# Patient Record
Sex: MALE | Race: WHITE | NOT HISPANIC OR LATINO | URBAN - METROPOLITAN AREA
[De-identification: names, ages, dates, MRNs, and addresses within clinical notes are randomized per-mention and may not be internally consistent; named-entity substitution may affect disease eponyms.]

---

## 2024-12-24 ENCOUNTER — APPOINTMENT (OUTPATIENT)
Dept: OTOLARYNGOLOGY | Facility: CLINIC | Age: 49
End: 2024-12-24
Payer: COMMERCIAL

## 2025-07-21 ENCOUNTER — APPOINTMENT (OUTPATIENT)
Dept: ORTHOPEDIC SURGERY | Facility: CLINIC | Age: 50
End: 2025-07-21
Payer: COMMERCIAL

## 2025-07-21 VITALS — HEIGHT: 74 IN | WEIGHT: 220 LBS | BODY MASS INDEX: 28.23 KG/M2

## 2025-07-21 DIAGNOSIS — S46.211A BICEPS STRAIN, RIGHT, INITIAL ENCOUNTER: Primary | ICD-10-CM

## 2025-07-21 PROCEDURE — 99203 OFFICE O/P NEW LOW 30 MIN: CPT | Performed by: ORTHOPAEDIC SURGERY

## 2025-07-21 PROCEDURE — 3008F BODY MASS INDEX DOCD: CPT | Performed by: ORTHOPAEDIC SURGERY

## 2025-07-21 NOTE — LETTER
July 21, 2025     ALBAN Lopez  [address]  [facility]  [facility] [address]    Patient: Wayne Mendoza   YOB: 1975   Date of Visit: 7/21/2025       Dear ALBAN Reagan:    Thank you for referring Wayne Mendoza to me for evaluation. Below are my notes for this consultation.  If you have questions, please do not hesitate to call me. I look forward to following your patient along with you.       Sincerely,     Valeriano Osman MD      CC: No Recipients  ______________________________________________________________________________________    [facility]  Hand and Upper Extremity Service  Initial evaluation / Consultation           Chief Complaint: Right arm          50 y.o right hand dominant male presenting for pain over the cornelius aspect of the upper arm just above the elbow. Symptoms have been present for 6-8 weeks and he does not recall any preceding traumatic event. He was seen by Dr. Olivares at [facility] and is here for a second opinion. About 30 years ago he sustained a right humeral shaft fracture and was treated with place fixation through a postero approach. 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 Over the last 6-8 weeks since his symptoms have been present, he has noticed burning and tingling symptoms in the superficial sensory nerve distribution on the dorsal radial aspect of the right hand. He has not had any formal treatment attempts yet but has been modifying his activities. It is with sustained forceful elbow flexion that he has most of his pain. Most of the pain is localized to the myotendinous junction of the biceps muscle well above the antecubital fossa.          Please refer to New Patient Intake Form scanned into patient's electronic record for self reported past medical history, past surgical  history, medications, allergies, family history, social history and 10 point review of systems    Examination:  Constitutional: Oriented to person, place, and time.  Appears well-developed and well-nourished.  Head: Normocephalic and atraumatic.  Eyes: Pupils are equal, round, and reactive to light.  Cardiovascular: Intact distal pulses.  Pulmonary/Chest/Breast: Effort normal. No respiratory distress.  Neurological: Alert and oriented to person, place, and time.  Skin: Skin is warm and dry.  Psychiatric: normal mood and affect.  Behavior is normal.  Musculoskeletal: Right arm reveals healed surgical incision postero aspect of the humerus. Good muscle bulk anteriorly and posteriorly. No change in the contour. Full elbow range of motion. No tenderness to palpation over the distal biceps tendon. Tendon is intact and palpable. Minimal pain to palpation over the myotendinous junction proximally of the biceps but pain is exacerbated in this same area with resisted elbow flexion or resisted forearm supination. Sensation intact to light touch but subjectively abnormal in the dorsal radial aspect of the right hand.        Personal Interpretation of Diagnostic studies: X-rays of right humerus brought in on disk from [facility] reveals a well healed humeral shaft fracture with anatomic alignment and a retained posterior surgical plate.        Impression:  Biceps strain       Plan: This appears to be localized to the myotendinous junction of his biceps muscle. His distal biceps clinically appears to be just fine and he is not having any pain with palpation or manipulation. Recommendations are for conservative management with activity modifications, anti-inflammatory medications, and physical therapy. If this does not resolve his symptoms then we can obtain MRI to evaluate the biceps. If that situation is encouraged, metal extraction is necessary because of his retained surgical implant. He has been referred to  physical therapy and if he is not noticing any improvement in his symptoms later in the summer, he can contact me so we can order a MRI of the right arm with metal subtraction technique.       Follow up: As needed              Valeriano Osman MD  [facility]  Department of Orthopaedic Surgery  Hand and Upper Extremity Reconstruction      Scribe Attestation  By signing my name below, I, Santos Joe , Scribe   attest that this documentation has been prepared under the direction and in the presence of Dr. Valeriano Osman.      Dictation performed with the use of voice recognition software.  Syntax and grammatical errors may exist.

## 2025-07-21 NOTE — PROGRESS NOTES
[facility]  Hand and Upper Extremity Service  Initial evaluation / Consultation           Chief Complaint: Right arm          50 y.o right hand dominant male presenting for pain over the cornelius aspect of the upper arm just above the elbow. Symptoms have been present for 6-8 weeks and he does not recall any preceding traumatic event. He was seen by Dr. Olivares at [facility] and is here for a second opinion. About 30 years ago he sustained a right humeral shaft fracture and was treated with place fixation through a postero approach. 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 Over the last 6-8 weeks since his symptoms have been present, he has noticed burning and tingling symptoms in the superficial sensory nerve distribution on the dorsal radial aspect of the right hand. He has not had any formal treatment attempts yet but has been modifying his activities. It is with sustained forceful elbow flexion that he has most of his pain. Most of the pain is localized to the myotendinous junction of the biceps muscle well above the antecubital fossa.          Please refer to New Patient Intake Form scanned into patient's electronic record for self reported past medical history, past surgical history, medications, allergies, family history, social history and 10 point review of systems    Examination:  Constitutional: Oriented to person, place, and time.  Appears well-developed and well-nourished.  Head: Normocephalic and atraumatic.  Eyes: Pupils are equal, round, and reactive to light.  Cardiovascular: Intact distal pulses.  Pulmonary/Chest/Breast: Effort normal. No respiratory distress.  Neurological: Alert and oriented to person, place, and time.  Skin: Skin is warm and dry.  Psychiatric: normal mood and affect.  Behavior is normal.  Musculoskeletal: Right arm reveals healed surgical incision postero aspect of the humerus. Good  muscle bulk anteriorly and posteriorly. No change in the contour. Full elbow range of motion. No tenderness to palpation over the distal biceps tendon. Tendon is intact and palpable. Minimal pain to palpation over the myotendinous junction proximally of the biceps but pain is exacerbated in this same area with resisted elbow flexion or resisted forearm supination. Sensation intact to light touch but subjectively abnormal in the dorsal radial aspect of the right hand.        Personal Interpretation of Diagnostic studies: X-rays of right humerus brought in on disk from [facility] reveals a well healed humeral shaft fracture with anatomic alignment and a retained posterior surgical plate.        Impression:  Biceps strain       Plan: This appears to be localized to the myotendinous junction of his biceps muscle. His distal biceps clinically appears to be just fine and he is not having any pain with palpation or manipulation. Recommendations are for conservative management with activity modifications, anti-inflammatory medications, and physical therapy. If this does not resolve his symptoms then we can obtain MRI to evaluate the biceps. If that situation is encouraged, metal extraction is necessary because of his retained surgical implant. He has been referred to physical therapy and if he is not noticing any improvement in his symptoms later in the summer, he can contact me so we can order a MRI of the right arm with metal subtraction technique.       Follow up: As needed              Valeriano Osman MD  [facility]  Department of Orthopaedic Surgery  Hand and Upper Extremity Reconstruction      Scribe Attestation  By signing my name below, I Lisetoneyda Diaz , Scribkristen   attest that this documentation has been prepared under the direction and in the presence of Dr. Valeriano Osman.      Dictation performed with the use of voice recognition software.  Syntax and grammatical errors  may exist.

## 2025-08-18 ENCOUNTER — DOCUMENTATION (OUTPATIENT)
Dept: PHYSICAL THERAPY | Facility: HOSPITAL | Age: 50
End: 2025-08-18
Payer: COMMERCIAL

## 2025-09-04 ENCOUNTER — EVALUATION (OUTPATIENT)
Dept: PHYSICAL THERAPY | Facility: HOSPITAL | Age: 50
End: 2025-09-04
Payer: COMMERCIAL

## 2025-09-04 DIAGNOSIS — S46.211D BICEPS STRAIN, RIGHT, SUBSEQUENT ENCOUNTER: Primary | ICD-10-CM

## 2025-09-04 PROCEDURE — 97162 PT EVAL MOD COMPLEX 30 MIN: CPT | Mod: GP | Performed by: PHYSICAL THERAPIST

## 2025-09-04 PROCEDURE — 97110 THERAPEUTIC EXERCISES: CPT | Mod: GP | Performed by: PHYSICAL THERAPIST
